# Patient Record
Sex: MALE | Race: WHITE | Employment: FULL TIME | URBAN - METROPOLITAN AREA
[De-identification: names, ages, dates, MRNs, and addresses within clinical notes are randomized per-mention and may not be internally consistent; named-entity substitution may affect disease eponyms.]

---

## 2019-11-21 ENCOUNTER — HOSPITAL ENCOUNTER (OUTPATIENT)
Age: 24
Discharge: HOME | End: 2019-11-21
Attending: EMERGENCY MEDICINE
Payer: COMMERCIAL

## 2019-11-21 ENCOUNTER — APPOINTMENT (OUTPATIENT)
Dept: GENERAL RADIOLOGY | Age: 24
End: 2019-11-21
Attending: EMERGENCY MEDICINE
Payer: COMMERCIAL

## 2019-11-21 VITALS
OXYGEN SATURATION: 99 % | SYSTOLIC BLOOD PRESSURE: 124 MMHG | DIASTOLIC BLOOD PRESSURE: 71 MMHG | RESPIRATION RATE: 20 BRPM | HEART RATE: 83 BPM | HEIGHT: 76 IN | BODY MASS INDEX: 25.09 KG/M2 | TEMPERATURE: 98 F | WEIGHT: 206 LBS

## 2019-11-21 DIAGNOSIS — B27.80 OTHER INFECTIOUS MONONUCLEOSIS WITHOUT COMPLICATION: Primary | ICD-10-CM

## 2019-11-21 PROCEDURE — 87430 STREP A AG IA: CPT

## 2019-11-21 PROCEDURE — 87081 CULTURE SCREEN ONLY: CPT | Performed by: EMERGENCY MEDICINE

## 2019-11-21 PROCEDURE — 99204 OFFICE O/P NEW MOD 45 MIN: CPT

## 2019-11-21 PROCEDURE — 86308 HETEROPHILE ANTIBODY SCREEN: CPT | Performed by: EMERGENCY MEDICINE

## 2019-11-21 PROCEDURE — 36415 COLL VENOUS BLD VENIPUNCTURE: CPT

## 2019-11-21 PROCEDURE — 70360 X-RAY EXAM OF NECK: CPT | Performed by: EMERGENCY MEDICINE

## 2019-11-21 RX ORDER — DEXTROAMPHETAMINE SACCHARATE, AMPHETAMINE ASPARTATE MONOHYDRATE, DEXTROAMPHETAMINE SULFATE AND AMPHETAMINE SULFATE 2.5; 2.5; 2.5; 2.5 MG/1; MG/1; MG/1; MG/1
10 CAPSULE, EXTENDED RELEASE ORAL EVERY MORNING
COMMUNITY

## 2019-11-21 NOTE — ED INITIAL ASSESSMENT (HPI)
REPORTS RIGHT SIDED EAR PAIN AND RIGHT SIDED FACIAL SWELLING ON 11/17. PATIENT STATES HE WAS DX WITH A VIRAL INFECTION AND PRESCRIBED PREDNISONE AND SUDAFED. STATES HE FLEW HERE FROM [place] YESTERDAY WITH INCREASED PAIN TO HIS RIGHT EAR.   PATIENT WITH

## 2019-11-21 NOTE — ED PROVIDER NOTES
Patient Seen in: 5 [facility]      History   Patient presents with:  Ear Problem Pain (neurosensory)    Stated Complaint: sore throat, right ear pain    HPI    Patient is a 40-year-old male without significant past medical minimal swelling, uvula midline, no drooling trismus or stridor  Neck: Supple with tender right lateral adenopathy  CV: Regular rate and rhythm no murmur, no gallop, no rub  Respiratory: Clear to auscultation bilaterally, good air movement, no wheezing or

## 2021-06-09 ENCOUNTER — OFFICE VISIT (OUTPATIENT)
Dept: INTERNAL MEDICINE CLINIC | Facility: CLINIC | Age: 26
End: 2021-06-09
Payer: COMMERCIAL

## 2021-06-09 VITALS
HEART RATE: 86 BPM | WEIGHT: 193.81 LBS | OXYGEN SATURATION: 99 % | SYSTOLIC BLOOD PRESSURE: 120 MMHG | BODY MASS INDEX: 23.6 KG/M2 | DIASTOLIC BLOOD PRESSURE: 70 MMHG | HEIGHT: 76 IN

## 2021-06-09 DIAGNOSIS — E53.8 VITAMIN B12 DEFICIENCY: Primary | ICD-10-CM

## 2021-06-09 DIAGNOSIS — Z00.00 ANNUAL PHYSICAL EXAM: Primary | ICD-10-CM

## 2021-06-09 DIAGNOSIS — L98.9 SKIN LESION: ICD-10-CM

## 2021-06-09 DIAGNOSIS — Z86.69 HISTORY OF CHIARI MALFORMATION: ICD-10-CM

## 2021-06-09 DIAGNOSIS — E53.8 VITAMIN B12 DEFICIENCY: ICD-10-CM

## 2021-06-09 PROCEDURE — 3074F SYST BP LT 130 MM HG: CPT | Performed by: INTERNAL MEDICINE

## 2021-06-09 PROCEDURE — 87591 N.GONORRHOEAE DNA AMP PROB: CPT | Performed by: INTERNAL MEDICINE

## 2021-06-09 PROCEDURE — 82306 VITAMIN D 25 HYDROXY: CPT | Performed by: INTERNAL MEDICINE

## 2021-06-09 PROCEDURE — 99385 PREV VISIT NEW AGE 18-39: CPT | Performed by: INTERNAL MEDICINE

## 2021-06-09 PROCEDURE — 90651 9VHPV VACCINE 2/3 DOSE IM: CPT | Performed by: INTERNAL MEDICINE

## 2021-06-09 PROCEDURE — 90471 IMMUNIZATION ADMIN: CPT | Performed by: INTERNAL MEDICINE

## 2021-06-09 PROCEDURE — 3078F DIAST BP <80 MM HG: CPT | Performed by: INTERNAL MEDICINE

## 2021-06-09 PROCEDURE — 82607 VITAMIN B-12: CPT | Performed by: INTERNAL MEDICINE

## 2021-06-09 PROCEDURE — 3008F BODY MASS INDEX DOCD: CPT | Performed by: INTERNAL MEDICINE

## 2021-06-09 PROCEDURE — 80050 GENERAL HEALTH PANEL: CPT | Performed by: INTERNAL MEDICINE

## 2021-06-09 PROCEDURE — 80061 LIPID PANEL: CPT | Performed by: INTERNAL MEDICINE

## 2021-06-09 PROCEDURE — 87491 CHLMYD TRACH DNA AMP PROBE: CPT | Performed by: INTERNAL MEDICINE

## 2021-06-09 RX ORDER — CYANOCOBALAMIN 1000 UG/ML
1000 INJECTION INTRAMUSCULAR; SUBCUTANEOUS
Status: SHIPPED | OUTPATIENT
Start: 2021-06-23 | End: 2021-07-20

## 2021-06-09 RX ORDER — DEXTROAMPHETAMINE SACCHARATE, AMPHETAMINE ASPARTATE, DEXTROAMPHETAMINE SULFATE AND AMPHETAMINE SULFATE 3.75; 3.75; 3.75; 3.75 MG/1; MG/1; MG/1; MG/1
TABLET ORAL
COMMUNITY

## 2021-06-09 NOTE — PROGRESS NOTES
Rossana Moise is a 32year old male.     Chief complaint: annual physical exam     HPI:     Rossana Moise is a 32year old pleasant male who presents for annual physical exam     History of chiari malformation   Had surgery back in 2013  Edu weinberg supple,no adenopathy  LUNGS: clear to auscultation  CARDIO: RRR without murmur  GI: no masses, HSM or tenderness  EXTREMITIES: no cyanosis, clubbing or edema  NEURO: no gross deficits              Orders Placed This Encounter      amphetamine-dextroampheta 25-HYDROXY; Future  - VITAMIN B12; Future  - TSH W REFLEX TO FREE T4; Future  - HPV HUMAN PAPILLOMA VIRUS VACC 9 KENDAL 3 DOSE IM  - CHLAMYDIA/GONOCOCCUS, ALBINO;  Future  - DERM - INTERNAL  - CBC WITH DIFFERENTIAL WITH PLATELET  - COMP METABOLIC PANEL (14)  - CHYNA

## 2021-06-10 PROBLEM — Z00.00 ANNUAL PHYSICAL EXAM: Status: ACTIVE | Noted: 2021-06-10

## 2021-06-10 PROBLEM — Z86.69 HISTORY OF CHIARI MALFORMATION: Status: ACTIVE | Noted: 2021-06-10

## 2021-06-10 PROBLEM — E53.8 VITAMIN B12 DEFICIENCY: Status: ACTIVE | Noted: 2021-06-10

## 2021-06-14 RX ORDER — ERGOCALCIFEROL 1.25 MG/1
50000 CAPSULE ORAL WEEKLY
Qty: 12 CAPSULE | Refills: 1 | Status: SHIPPED | OUTPATIENT
Start: 2021-06-14 | End: 2021-06-29

## 2021-06-15 ENCOUNTER — NURSE ONLY (OUTPATIENT)
Dept: INTERNAL MEDICINE CLINIC | Facility: CLINIC | Age: 26
End: 2021-06-15
Payer: COMMERCIAL

## 2021-06-15 PROCEDURE — 96372 THER/PROPH/DIAG INJ SC/IM: CPT | Performed by: INTERNAL MEDICINE

## 2021-06-15 RX ADMIN — CYANOCOBALAMIN 1000 MCG: 1000 INJECTION INTRAMUSCULAR; SUBCUTANEOUS at 13:44:00

## 2021-06-15 NOTE — PROGRESS NOTES
Patient presents for Vit B12 injection. Administered in left deltoid. Patient tolerated well and left the office without complaints.

## 2021-06-22 ENCOUNTER — NURSE ONLY (OUTPATIENT)
Dept: INTERNAL MEDICINE CLINIC | Facility: CLINIC | Age: 26
End: 2021-06-22
Payer: COMMERCIAL

## 2021-06-22 PROCEDURE — 96372 THER/PROPH/DIAG INJ SC/IM: CPT | Performed by: INTERNAL MEDICINE

## 2021-06-22 RX ADMIN — CYANOCOBALAMIN 1000 MCG: 1000 INJECTION INTRAMUSCULAR; SUBCUTANEOUS at 13:01:00

## 2021-06-29 RX ORDER — ERGOCALCIFEROL 1.25 MG/1
50000 CAPSULE ORAL WEEKLY
Qty: 12 CAPSULE | Refills: 1 | Status: SHIPPED | OUTPATIENT
Start: 2021-06-29 | End: 2021-09-15

## 2021-06-30 ENCOUNTER — NURSE ONLY (OUTPATIENT)
Dept: INTERNAL MEDICINE CLINIC | Facility: CLINIC | Age: 26
End: 2021-06-30
Payer: COMMERCIAL

## 2021-06-30 PROCEDURE — 96372 THER/PROPH/DIAG INJ SC/IM: CPT | Performed by: INTERNAL MEDICINE

## 2021-06-30 RX ADMIN — CYANOCOBALAMIN 1000 MCG: 1000 INJECTION INTRAMUSCULAR; SUBCUTANEOUS at 12:52:00

## 2021-07-06 ENCOUNTER — NURSE ONLY (OUTPATIENT)
Dept: INTERNAL MEDICINE CLINIC | Facility: CLINIC | Age: 26
End: 2021-07-06
Payer: COMMERCIAL

## 2021-07-06 DIAGNOSIS — E53.8 VITAMIN B12 DEFICIENCY: ICD-10-CM

## 2021-07-30 ENCOUNTER — OFFICE VISIT (OUTPATIENT)
Dept: FAMILY MEDICINE CLINIC | Facility: CLINIC | Age: 26
End: 2021-07-30
Payer: COMMERCIAL

## 2021-07-30 VITALS
OXYGEN SATURATION: 100 % | SYSTOLIC BLOOD PRESSURE: 120 MMHG | TEMPERATURE: 98 F | DIASTOLIC BLOOD PRESSURE: 68 MMHG | WEIGHT: 196 LBS | BODY MASS INDEX: 23.87 KG/M2 | HEART RATE: 93 BPM | RESPIRATION RATE: 16 BRPM | HEIGHT: 76 IN

## 2021-07-30 DIAGNOSIS — Z11.52 ENCOUNTER FOR SCREENING FOR COVID-19: ICD-10-CM

## 2021-07-30 DIAGNOSIS — R05.9 COUGH: Primary | ICD-10-CM

## 2021-07-30 PROCEDURE — 99202 OFFICE O/P NEW SF 15 MIN: CPT | Performed by: PHYSICIAN ASSISTANT

## 2021-07-30 PROCEDURE — 3078F DIAST BP <80 MM HG: CPT | Performed by: PHYSICIAN ASSISTANT

## 2021-07-30 PROCEDURE — 3074F SYST BP LT 130 MM HG: CPT | Performed by: PHYSICIAN ASSISTANT

## 2021-07-30 PROCEDURE — 3008F BODY MASS INDEX DOCD: CPT | Performed by: PHYSICIAN ASSISTANT

## 2021-07-30 NOTE — PROGRESS NOTES
Patient presents with:  Covid: having symp diarrhea,nausea, and SOB      HPI:   Brooks Porter is a 32year old male who presents for COVID concerns. Patient had no known exposure to COVID/strep but does work as a .  Mild symptoms at this REVIEW OF SYSTEMS:   GENERAL:  good appetite  SKIN: no rashes or abnormal skin lesions  HEENT: See HPI.     LUNGS: denies shortness of breath or wheezing   CARDIOVASCULAR: denies chest pain or palpitations   GI: denies abdominal pain or nausea  NEURO: m instructions also in [address] Follow up prn or with pcp/clinic if symptoms develop / worsen as  within 1-2 days discussed, ED precautions advised. Patient understands and agrees with plan.      Madison Monzon PA-C  7/30/2021  2:55 PM

## 2021-07-31 LAB — SARS-COV-2 RNA RESP QL NAA+PROBE: NOT DETECTED

## 2021-08-09 ENCOUNTER — NURSE ONLY (OUTPATIENT)
Dept: INTERNAL MEDICINE CLINIC | Facility: CLINIC | Age: 26
End: 2021-08-09
Payer: COMMERCIAL

## 2021-08-09 DIAGNOSIS — E53.8 VITAMIN B12 DEFICIENCY: ICD-10-CM

## 2021-08-09 DIAGNOSIS — Z23 NEED FOR HPV VACCINATION: Primary | ICD-10-CM

## 2021-08-09 PROCEDURE — 96372 THER/PROPH/DIAG INJ SC/IM: CPT | Performed by: INTERNAL MEDICINE

## 2021-08-09 PROCEDURE — 90471 IMMUNIZATION ADMIN: CPT | Performed by: INTERNAL MEDICINE

## 2021-08-09 PROCEDURE — 90651 9VHPV VACCINE 2/3 DOSE IM: CPT | Performed by: INTERNAL MEDICINE

## 2021-08-09 RX ORDER — CYANOCOBALAMIN 1000 UG/ML
1000 INJECTION INTRAMUSCULAR; SUBCUTANEOUS ONCE
Status: COMPLETED | OUTPATIENT
Start: 2021-08-09 | End: 2021-08-09

## 2021-08-09 RX ADMIN — CYANOCOBALAMIN 1000 MCG: 1000 INJECTION INTRAMUSCULAR; SUBCUTANEOUS at 12:43:00

## 2021-09-09 ENCOUNTER — TELEPHONE (OUTPATIENT)
Dept: INTERNAL MEDICINE CLINIC | Facility: CLINIC | Age: 26
End: 2021-09-09

## 2021-09-09 ENCOUNTER — NURSE ONLY (OUTPATIENT)
Dept: INTERNAL MEDICINE CLINIC | Facility: CLINIC | Age: 26
End: 2021-09-09
Payer: COMMERCIAL

## 2021-09-09 DIAGNOSIS — E53.8 VITAMIN B12 DEFICIENCY: Primary | ICD-10-CM

## 2021-09-09 PROCEDURE — 96372 THER/PROPH/DIAG INJ SC/IM: CPT | Performed by: INTERNAL MEDICINE

## 2021-09-09 RX ORDER — CYANOCOBALAMIN 1000 UG/ML
1000 INJECTION INTRAMUSCULAR; SUBCUTANEOUS ONCE
Status: COMPLETED | OUTPATIENT
Start: 2021-09-09 | End: 2021-09-09

## 2021-09-09 RX ADMIN — CYANOCOBALAMIN 1000 MCG: 1000 INJECTION INTRAMUSCULAR; SUBCUTANEOUS at 09:21:00

## 2021-09-09 NOTE — PROGRESS NOTES
Patient presents for B12 injection. Administered in left deltoid. Patient tolerated well and left the office without complaints.

## 2021-09-09 NOTE — TELEPHONE ENCOUNTER
Patient came in today for his final B12 injection. He would like to know when he should repeat his labs to check his levels. He is also asking about the HPV vaccine.  He was told that he would only need 2 doses but his partner who is the same age is doing 1

## 2021-10-14 ENCOUNTER — LAB ENCOUNTER (OUTPATIENT)
Dept: LAB | Age: 26
End: 2021-10-14
Attending: INTERNAL MEDICINE
Payer: COMMERCIAL

## 2021-10-14 DIAGNOSIS — E53.8 VITAMIN B12 DEFICIENCY: ICD-10-CM

## 2021-10-14 PROCEDURE — 36415 COLL VENOUS BLD VENIPUNCTURE: CPT

## 2021-10-14 PROCEDURE — 82607 VITAMIN B-12: CPT

## 2021-10-15 DIAGNOSIS — E53.8 VITAMIN B12 DEFICIENCY: Primary | ICD-10-CM

## 2021-10-15 RX ORDER — CYANOCOBALAMIN 1000 UG/ML
1000 INJECTION INTRAMUSCULAR; SUBCUTANEOUS
Status: SHIPPED | OUTPATIENT
Start: 2021-11-15 | End: 2022-02-13

## 2021-11-18 ENCOUNTER — IMMUNIZATION (OUTPATIENT)
Dept: LAB | Facility: HOSPITAL | Age: 26
End: 2021-11-18
Attending: EMERGENCY MEDICINE
Payer: COMMERCIAL

## 2021-11-18 DIAGNOSIS — Z23 NEED FOR VACCINATION: Primary | ICD-10-CM

## 2021-11-18 PROCEDURE — 0064A SARSCOV2 VAC 50MCG/0.25ML IM: CPT | Performed by: NURSE PRACTITIONER

## 2022-07-01 ENCOUNTER — OFFICE VISIT (OUTPATIENT)
Dept: FAMILY MEDICINE CLINIC | Facility: CLINIC | Age: 27
End: 2022-07-01
Payer: COMMERCIAL

## 2022-07-01 VITALS
HEIGHT: 76 IN | DIASTOLIC BLOOD PRESSURE: 65 MMHG | OXYGEN SATURATION: 99 % | SYSTOLIC BLOOD PRESSURE: 124 MMHG | RESPIRATION RATE: 18 BRPM | TEMPERATURE: 98 F | WEIGHT: 200 LBS | HEART RATE: 87 BPM | BODY MASS INDEX: 24.36 KG/M2

## 2022-07-01 DIAGNOSIS — U07.1 POSITIVE SELF-ADMINISTERED ANTIGEN TEST FOR COVID-19: Primary | ICD-10-CM

## 2022-07-01 RX ORDER — DEXTROAMPHETAMINE/AMPHETAMINE 15 MG
15 CAPSULE, EXT RELEASE 24 HR ORAL 2 TIMES DAILY
COMMUNITY
Start: 2022-06-27

## 2022-07-02 LAB — SARS-COV-2 RNA RESP QL NAA+PROBE: DETECTED

## 2023-04-03 ENCOUNTER — OFFICE VISIT (OUTPATIENT)
Dept: INTERNAL MEDICINE CLINIC | Facility: CLINIC | Age: 28
End: 2023-04-03
Payer: COMMERCIAL

## 2023-04-03 VITALS
BODY MASS INDEX: 26.26 KG/M2 | OXYGEN SATURATION: 98 % | HEIGHT: 76 IN | SYSTOLIC BLOOD PRESSURE: 124 MMHG | HEART RATE: 82 BPM | DIASTOLIC BLOOD PRESSURE: 78 MMHG | WEIGHT: 215.63 LBS

## 2023-04-03 DIAGNOSIS — E53.8 VITAMIN B12 DEFICIENCY: ICD-10-CM

## 2023-04-03 DIAGNOSIS — E55.9 VITAMIN D DEFICIENCY: ICD-10-CM

## 2023-04-03 DIAGNOSIS — Z12.83 SKIN CANCER SCREENING: ICD-10-CM

## 2023-04-03 DIAGNOSIS — Z00.00 ANNUAL PHYSICAL EXAM: Primary | ICD-10-CM

## 2023-04-03 PROCEDURE — 90651 9VHPV VACCINE 2/3 DOSE IM: CPT | Performed by: INTERNAL MEDICINE

## 2023-04-03 PROCEDURE — 99395 PREV VISIT EST AGE 18-39: CPT | Performed by: INTERNAL MEDICINE

## 2023-04-03 PROCEDURE — 3008F BODY MASS INDEX DOCD: CPT | Performed by: INTERNAL MEDICINE

## 2023-04-03 PROCEDURE — 3078F DIAST BP <80 MM HG: CPT | Performed by: INTERNAL MEDICINE

## 2023-04-03 PROCEDURE — 90471 IMMUNIZATION ADMIN: CPT | Performed by: INTERNAL MEDICINE

## 2023-04-03 PROCEDURE — 3074F SYST BP LT 130 MM HG: CPT | Performed by: INTERNAL MEDICINE

## 2023-04-06 ENCOUNTER — LAB ENCOUNTER (OUTPATIENT)
Dept: LAB | Age: 28
End: 2023-04-06
Attending: INTERNAL MEDICINE
Payer: COMMERCIAL

## 2023-04-06 DIAGNOSIS — Z00.00 ANNUAL PHYSICAL EXAM: ICD-10-CM

## 2023-04-06 DIAGNOSIS — Z12.83 SKIN CANCER SCREENING: ICD-10-CM

## 2023-04-06 DIAGNOSIS — E53.8 VITAMIN B12 DEFICIENCY: ICD-10-CM

## 2023-04-06 DIAGNOSIS — E55.9 VITAMIN D DEFICIENCY: ICD-10-CM

## 2023-04-06 LAB
ALBUMIN SERPL-MCNC: 4.2 G/DL (ref 3.4–5)
ALBUMIN/GLOB SERPL: 1.4 {RATIO} (ref 1–2)
ALP LIVER SERPL-CCNC: 80 U/L
ALT SERPL-CCNC: 35 U/L
ANION GAP SERPL CALC-SCNC: 4 MMOL/L (ref 0–18)
AST SERPL-CCNC: 34 U/L (ref 15–37)
BASOPHILS # BLD AUTO: 0.04 X10(3) UL (ref 0–0.2)
BASOPHILS NFR BLD AUTO: 0.9 %
BILIRUB SERPL-MCNC: 0.5 MG/DL (ref 0.1–2)
BUN BLD-MCNC: 18 MG/DL (ref 7–18)
BUN/CREAT SERPL: 18.2 (ref 10–20)
CALCIUM BLD-MCNC: 9.6 MG/DL (ref 8.5–10.1)
CHLORIDE SERPL-SCNC: 108 MMOL/L (ref 98–112)
CHOLEST SERPL-MCNC: 152 MG/DL (ref ?–200)
CO2 SERPL-SCNC: 29 MMOL/L (ref 21–32)
CREAT BLD-MCNC: 0.99 MG/DL
DEPRECATED RDW RBC AUTO: 40.7 FL (ref 35.1–46.3)
EOSINOPHIL # BLD AUTO: 0.07 X10(3) UL (ref 0–0.7)
EOSINOPHIL NFR BLD AUTO: 1.5 %
ERYTHROCYTE [DISTWIDTH] IN BLOOD BY AUTOMATED COUNT: 12.1 % (ref 11–15)
EST. AVERAGE GLUCOSE BLD GHB EST-MCNC: 103 MG/DL (ref 68–126)
FASTING PATIENT LIPID ANSWER: YES
FASTING STATUS PATIENT QL REPORTED: YES
GFR SERPLBLD BASED ON 1.73 SQ M-ARVRAT: 106 ML/MIN/1.73M2 (ref 60–?)
GLOBULIN PLAS-MCNC: 2.9 G/DL (ref 2.8–4.4)
GLUCOSE BLD-MCNC: 90 MG/DL (ref 70–99)
HBA1C MFR BLD: 5.2 % (ref ?–5.7)
HCT VFR BLD AUTO: 46.6 %
HDLC SERPL-MCNC: 85 MG/DL (ref 40–59)
HGB BLD-MCNC: 15.6 G/DL
IMM GRANULOCYTES # BLD AUTO: 0.01 X10(3) UL (ref 0–1)
IMM GRANULOCYTES NFR BLD: 0.2 %
LDLC SERPL CALC-MCNC: 58 MG/DL (ref ?–100)
LYMPHOCYTES # BLD AUTO: 1.47 X10(3) UL (ref 1–4)
LYMPHOCYTES NFR BLD AUTO: 32.1 %
MCH RBC QN AUTO: 30.5 PG (ref 26–34)
MCHC RBC AUTO-ENTMCNC: 33.5 G/DL (ref 31–37)
MCV RBC AUTO: 91.2 FL
MONOCYTES # BLD AUTO: 0.42 X10(3) UL (ref 0.1–1)
MONOCYTES NFR BLD AUTO: 9.2 %
NEUTROPHILS # BLD AUTO: 2.57 X10 (3) UL (ref 1.5–7.7)
NEUTROPHILS # BLD AUTO: 2.57 X10(3) UL (ref 1.5–7.7)
NEUTROPHILS NFR BLD AUTO: 56.1 %
NONHDLC SERPL-MCNC: 67 MG/DL (ref ?–130)
OSMOLALITY SERPL CALC.SUM OF ELEC: 293 MOSM/KG (ref 275–295)
PLATELET # BLD AUTO: 273 10(3)UL (ref 150–450)
POTASSIUM SERPL-SCNC: 4.7 MMOL/L (ref 3.5–5.1)
PROT SERPL-MCNC: 7.1 G/DL (ref 6.4–8.2)
RBC # BLD AUTO: 5.11 X10(6)UL
SODIUM SERPL-SCNC: 141 MMOL/L (ref 136–145)
TRIGL SERPL-MCNC: 38 MG/DL (ref 30–149)
VIT B12 SERPL-MCNC: 214 PG/ML (ref 193–986)
VIT D+METAB SERPL-MCNC: 31.7 NG/ML (ref 30–100)
VLDLC SERPL CALC-MCNC: 6 MG/DL (ref 0–30)
WBC # BLD AUTO: 4.6 X10(3) UL (ref 4–11)

## 2023-04-06 PROCEDURE — 80061 LIPID PANEL: CPT

## 2023-04-06 PROCEDURE — 80053 COMPREHEN METABOLIC PANEL: CPT

## 2023-04-06 PROCEDURE — 36415 COLL VENOUS BLD VENIPUNCTURE: CPT

## 2023-04-06 PROCEDURE — 82306 VITAMIN D 25 HYDROXY: CPT

## 2023-04-06 PROCEDURE — 86364 TISS TRNSGLTMNASE EA IG CLAS: CPT

## 2023-04-06 PROCEDURE — 82607 VITAMIN B-12: CPT

## 2023-04-06 PROCEDURE — 85025 COMPLETE CBC W/AUTO DIFF WBC: CPT

## 2023-04-06 PROCEDURE — 83516 IMMUNOASSAY NONANTIBODY: CPT

## 2023-04-06 PROCEDURE — 82784 ASSAY IGA/IGD/IGG/IGM EACH: CPT

## 2023-04-06 PROCEDURE — 83036 HEMOGLOBIN GLYCOSYLATED A1C: CPT

## 2023-04-07 DIAGNOSIS — E53.8 VITAMIN B12 DEFICIENCY: Primary | ICD-10-CM

## 2023-04-07 LAB — IGA SERPL-MCNC: 215 MG/DL (ref 70–312)

## 2023-04-08 LAB — PARIETAL CELL AB: 7.6 UNITS

## 2023-04-12 LAB — TTG IGA SER-ACNC: 0.6 U/ML (ref ?–7)

## 2024-05-14 ENCOUNTER — OFFICE VISIT (OUTPATIENT)
Dept: INTERNAL MEDICINE CLINIC | Facility: CLINIC | Age: 29
End: 2024-05-14

## 2024-05-14 ENCOUNTER — HOSPITAL ENCOUNTER (OUTPATIENT)
Age: 29
Discharge: HOME | End: 2024-05-14

## 2024-05-14 VITALS
HEIGHT: 76 IN | WEIGHT: 231 LBS | OXYGEN SATURATION: 99 % | HEART RATE: 114 BPM | SYSTOLIC BLOOD PRESSURE: 110 MMHG | DIASTOLIC BLOOD PRESSURE: 68 MMHG | BODY MASS INDEX: 28.13 KG/M2

## 2024-05-14 DIAGNOSIS — Z20.6 EXPOSURE TO HIV: Primary | ICD-10-CM

## 2024-05-14 PROCEDURE — 3078F DIAST BP <80 MM HG: CPT

## 2024-05-14 PROCEDURE — 3008F BODY MASS INDEX DOCD: CPT

## 2024-05-14 PROCEDURE — 99214 OFFICE O/P EST MOD 30 MIN: CPT

## 2024-05-14 PROCEDURE — 3074F SYST BP LT 130 MM HG: CPT

## 2024-05-14 RX ORDER — DOLUTEGRAVIR SODIUM 50 MG/1
50 TABLET, FILM COATED ORAL DAILY
Qty: 28 TABLET | Refills: 0 | Status: SHIPPED | OUTPATIENT
Start: 2024-05-14

## 2024-05-14 RX ORDER — EMTRICITABINE AND TENOFOVIR ALAFENAMIDE 200; 25 MG/1; MG/1
1 TABLET ORAL DAILY
Qty: 30 TABLET | Refills: 0 | Status: SHIPPED | OUTPATIENT
Start: 2024-05-14 | End: 2024-05-14 | Stop reason: RX

## 2024-05-14 RX ORDER — EMTRICITABINE AND TENOFOVIR ALAFENAMIDE 200; 25 MG/1; MG/1
1 TABLET ORAL DAILY
Qty: 30 TABLET | Refills: 0 | Status: SHIPPED | OUTPATIENT
Start: 2024-05-14 | End: 2024-06-13

## 2024-05-14 NOTE — PROGRESS NOTES
CHIEF COMPLAINT:     Chief Complaint   Patient presents with    Medication Request     HPI:   Nik Henley is a 29 year old male who presents for request of medication, for post exposure to +HIV person on evening of 5/12/24.  No prior history of renal or liver health problems.  No known history of prior HIV.  No penile discharge or lesions.  Here with male partner Mckay  Med: Amphetamine-dextroamphetamine 15 mg oral tab- Historical   Past Medical History:    ADD (attention deficit disorder)    Anxiety    Depression      Social History:  Social History     Socioeconomic History    Marital status: Single   Tobacco Use    Smoking status: Never    Smokeless tobacco: Never   Vaping Use    Vaping status: Never Used   Substance and Sexual Activity    Alcohol use: Yes     Alcohol/week: 2.0 standard drinks of alcohol     Types: 1 Shots of liquor, 1 Glasses of wine per week     Comment: social     Drug use: Never        REVIEW OF SYSTEMS:   GENERAL: No report of fever or change in appetite  SKIN: No rashes or bruises  GI: Denies abdominal pain, N/V/C/D.   : No change in urinary pattern    EXAM:   /68   Pulse 114   Ht 6' 4\" (1.93 m)   Wt 231 lb (104.8 kg)   SpO2 99%   BMI 28.12 kg/m²   Vitals:    05/14/24 1458   BP: 110/68   Pulse: 114   SpO2: 99%   Weight: 231 lb (104.8 kg)   Height: 6' 4\" (1.93 m)     GENERAL: well developed, well nourished,in no apparent distress  SKIN: no rashes on abd or arms  NECK: supple, non-tender  GI: No masses. BS's present x4. No palpable masses or hepatosplenomegaly.  no tenderness on palpation.    ASSESSMENT AND PLAN:     ASSESSMENT/PLAN:    1. Exposure to HIV    - HIV AG AB Combo [E]; Future  - Comp Metabolic Panel (14); Future  - CBC With Differential With Platelet; Future  - Hepatitis A B + C profile [E]; Future  - HIV Ag/Ab Combo; Future  - HIV Ag/Ab Combo; Future    Follow up HIV combo labs ordered for 6 and 12 weeks out.      Medications    Emtricitabine-Tenofovir AF  (DESCOVY) 200-25 MG Oral Tab    dolutegravir (TIVICAY) 50 MG Oral Tab     Descovy was the only local medication available tonight, in this class. Tivicay is a special order mailed to them through [facility]    Patient Instructions   Please get baseline labs done at either [place] or [facility] outpatient labs. They are open until 8pm and open again at 6:30am.   There is an order in place for follow-up labs for 6 and 12 weeks out as well.  Please set it up an appointment for an annual physical with Dr. Parra.  I will be in touch on Thursday, 5/16/24 or message me through My Chart,  Kindly, EFRAIN King      The patient indicates understanding of these issues and agrees to the plan.    Case reviewed with Dr. Bailey over phone

## 2024-05-14 NOTE — PATIENT INSTRUCTIONS
Please get baseline labs done at either [place] or [facility] outpatient labs. They are open until 8pm and open again at 6:30am.   There is an order in place for follow-up labs for 6 and 12 weeks out as well.  Please set it up an appointment for an annual physical with Dr. Parra.  I will be in touch on Thursday, 5/16/24 or message me through My Chart,  Kindly, HILARIA Millan

## 2024-05-16 ENCOUNTER — TELEPHONE (OUTPATIENT)
Dept: INTERNAL MEDICINE CLINIC | Facility: CLINIC | Age: 29
End: 2024-05-16

## 2024-05-16 ENCOUNTER — LAB ENCOUNTER (OUTPATIENT)
Dept: LAB | Age: 29
End: 2024-05-16

## 2024-05-16 DIAGNOSIS — Z20.6 EXPOSURE TO HIV: ICD-10-CM

## 2024-05-16 LAB
ALBUMIN SERPL-MCNC: 4.6 G/DL (ref 3.2–4.8)
ALBUMIN/GLOB SERPL: 1.8 {RATIO} (ref 1–2)
ALP LIVER SERPL-CCNC: 72 U/L
ALT SERPL-CCNC: 20 U/L
ANION GAP SERPL CALC-SCNC: 4 MMOL/L (ref 0–18)
AST SERPL-CCNC: 21 U/L (ref ?–34)
BASOPHILS # BLD AUTO: 0.01 X10(3) UL (ref 0–0.2)
BASOPHILS NFR BLD AUTO: 0.2 %
BILIRUB SERPL-MCNC: 0.9 MG/DL (ref 0.3–1.2)
BUN BLD-MCNC: 10 MG/DL (ref 9–23)
BUN/CREAT SERPL: 11.2 (ref 10–20)
CALCIUM BLD-MCNC: 9.5 MG/DL (ref 8.7–10.4)
CHLORIDE SERPL-SCNC: 109 MMOL/L (ref 98–112)
CO2 SERPL-SCNC: 29 MMOL/L (ref 21–32)
CREAT BLD-MCNC: 0.89 MG/DL
DEPRECATED RDW RBC AUTO: 39.1 FL (ref 35.1–46.3)
EGFRCR SERPLBLD CKD-EPI 2021: 119 ML/MIN/1.73M2 (ref 60–?)
EOSINOPHIL # BLD AUTO: 0.11 X10(3) UL (ref 0–0.7)
EOSINOPHIL NFR BLD AUTO: 2.6 %
ERYTHROCYTE [DISTWIDTH] IN BLOOD BY AUTOMATED COUNT: 12 % (ref 11–15)
FASTING STATUS PATIENT QL REPORTED: YES
GLOBULIN PLAS-MCNC: 2.6 G/DL (ref 2–3.5)
GLUCOSE BLD-MCNC: 91 MG/DL (ref 70–99)
HAV AB SER QL IA: REACTIVE
HAV IGM SER QL: NONREACTIVE
HBV CORE AB SERPL QL IA: NONREACTIVE
HBV SURFACE AB SER QL: NONREACTIVE
HBV SURFACE AB SERPL IA-ACNC: <3.1 MIU/ML
HBV SURFACE AG SERPL QL IA: NONREACTIVE
HCT VFR BLD AUTO: 45.7 %
HCV AB SERPL QL IA: NONREACTIVE
HGB BLD-MCNC: 15.8 G/DL
IMM GRANULOCYTES # BLD AUTO: 0.01 X10(3) UL (ref 0–1)
IMM GRANULOCYTES NFR BLD: 0.2 %
LYMPHOCYTES # BLD AUTO: 0.59 X10(3) UL (ref 1–4)
LYMPHOCYTES NFR BLD AUTO: 14 %
MCH RBC QN AUTO: 30.7 PG (ref 26–34)
MCHC RBC AUTO-ENTMCNC: 34.6 G/DL (ref 31–37)
MCV RBC AUTO: 88.7 FL
MONOCYTES # BLD AUTO: 0.45 X10(3) UL (ref 0.1–1)
MONOCYTES NFR BLD AUTO: 10.7 %
NEUTROPHILS # BLD AUTO: 3.03 X10 (3) UL (ref 1.5–7.7)
NEUTROPHILS # BLD AUTO: 3.03 X10(3) UL (ref 1.5–7.7)
NEUTROPHILS NFR BLD AUTO: 72.3 %
OSMOLALITY SERPL CALC.SUM OF ELEC: 293 MOSM/KG (ref 275–295)
PLATELET # BLD AUTO: 256 10(3)UL (ref 150–450)
POTASSIUM SERPL-SCNC: 4.2 MMOL/L (ref 3.5–5.1)
PROT SERPL-MCNC: 7.2 G/DL (ref 5.7–8.2)
RBC # BLD AUTO: 5.15 X10(6)UL
SODIUM SERPL-SCNC: 142 MMOL/L (ref 136–145)
WBC # BLD AUTO: 4.2 X10(3) UL (ref 4–11)

## 2024-05-16 PROCEDURE — 80503 PATH CLIN CONSLTJ SF 5-20: CPT

## 2024-05-16 PROCEDURE — 86706 HEP B SURFACE ANTIBODY: CPT

## 2024-05-16 PROCEDURE — 87389 HIV-1 AG W/HIV-1&-2 AB AG IA: CPT

## 2024-05-16 PROCEDURE — 86708 HEPATITIS A ANTIBODY: CPT

## 2024-05-16 PROCEDURE — 87340 HEPATITIS B SURFACE AG IA: CPT

## 2024-05-16 PROCEDURE — 86709 HEPATITIS A IGM ANTIBODY: CPT

## 2024-05-16 PROCEDURE — 86803 HEPATITIS C AB TEST: CPT

## 2024-05-16 PROCEDURE — 86704 HEP B CORE ANTIBODY TOTAL: CPT

## 2024-05-16 PROCEDURE — 80053 COMPREHEN METABOLIC PANEL: CPT

## 2024-05-16 PROCEDURE — 85025 COMPLETE CBC W/AUTO DIFF WBC: CPT

## 2024-05-16 NOTE — TELEPHONE ENCOUNTER
Patient called with medication question,  Please call back for medication direction clarification.

## 2024-05-23 ENCOUNTER — OFFICE VISIT (OUTPATIENT)
Dept: INTERNAL MEDICINE CLINIC | Facility: CLINIC | Age: 29
End: 2024-05-23

## 2024-05-23 VITALS
WEIGHT: 229.38 LBS | TEMPERATURE: 99 F | OXYGEN SATURATION: 99 % | SYSTOLIC BLOOD PRESSURE: 108 MMHG | DIASTOLIC BLOOD PRESSURE: 60 MMHG | HEIGHT: 76 IN | BODY MASS INDEX: 27.93 KG/M2 | HEART RATE: 96 BPM

## 2024-05-23 DIAGNOSIS — H92.01 OTALGIA OF RIGHT EAR: Primary | ICD-10-CM

## 2024-05-23 DIAGNOSIS — J30.89 ENVIRONMENTAL AND SEASONAL ALLERGIES: ICD-10-CM

## 2024-05-23 DIAGNOSIS — H69.93 ETD (EUSTACHIAN TUBE DYSFUNCTION), BILATERAL: ICD-10-CM

## 2024-05-23 PROCEDURE — 3008F BODY MASS INDEX DOCD: CPT

## 2024-05-23 PROCEDURE — 99213 OFFICE O/P EST LOW 20 MIN: CPT

## 2024-05-23 PROCEDURE — 3074F SYST BP LT 130 MM HG: CPT

## 2024-05-23 PROCEDURE — 3078F DIAST BP <80 MM HG: CPT

## 2024-05-23 RX ORDER — CEFDINIR 300 MG/1
CAPSULE ORAL
COMMUNITY
Start: 2024-04-17

## 2024-05-23 NOTE — PROGRESS NOTES
CHIEF COMPLAINT:     Chief Complaint   Patient presents with    Ear Problem     Possible ear infection on right ear, left ear feels congestion- since Sunday night 05/19/2024     HPI:   Nik Henley is a 29 year old male with hx of intermittent right ear pressure nasal congestion and postnasal drainage.  No sore throat  -No ear ringing. Had ear tubes as child.  -Patient works as a  and pain has been worse lately, especially when flying Called off work today.    -Patient has had a history of seasonal and environmental allergies since childhood.  Currently using a 12-hour \"Sinex\" nasal spray, which gives some temporary relief, Recently restarted Flonase nasal spray. Has a little fatigue as not sleeping well.   -No known fever.  Reports some nasal discharge and throat clearing.   -Patient has had noticeable ear symptoms for several days, but had Sinusitis/URI and given Cefdinir 4/17/24      Current Outpatient Medications   Medication Sig Dispense Refill    Emtricitabine-Tenofovir AF (DESCOVY) 200-25 MG Oral Tab Take 1 tablet by mouth daily. 30 tablet 0    dolutegravir (TIVICAY) 50 MG Oral Tab Take 1 tablet (50 mg total) by mouth daily. 28 tablet 0    amphetamine-dextroamphetamine 15 MG Oral Tab       cefdinir 300 MG Oral Cap TAKE 1 CAPSULE (300 MG TOTAL) BY MOUTH TWO TIMES A DAY FOR 7 DAYS. (Patient not taking: Reported on 5/23/2024)        Past Medical History:    ADD (attention deficit disorder)    Anxiety    Depression      Past Surgical History:   Procedure Laterality Date    Brain surgery      Hc implant ear tubes        Family History   Problem Relation Age of Onset    No Known Problems Father     Depression Mother     Other (Other) Mother       Social History     Socioeconomic History    Marital status: Single   Tobacco Use    Smoking status: Never    Smokeless tobacco: Never   Vaping Use    Vaping status: Never Used   Substance and Sexual Activity    Alcohol use: Yes     Alcohol/week: 2.0  standard drinks of alcohol     Types: 1 Shots of liquor, 1 Glasses of wine per week     Comment: social     Drug use: Never         REVIEW OF SYSTEMS:   GENERAL:  See HPI  HEENT: See HPI  LUNGS: denies shortness of breath or wheezing, See HPI. Cough to clear throat.  GI: denies N/V/D  NEURO: Denies headaches    EXAM:   /60   Pulse 96   Temp 98.5 °F (36.9 °C)   Ht 6' 4\" (1.93 m)   Wt 229 lb 6.4 oz (104.1 kg)   SpO2 99%   BMI 27.92 kg/m²   GENERAL: well developed, well nourished.  Pt is in no acute distress  SKIN: no rashes on exposed skin  EYES: conjunctiva clear, EOM intact  EARS: TM's are dull, non-bulging and non-injected, bilaterally serous flud, more hazy on left  NOSE: No exudates, nasal mucosa is erythematous and swollen.   THROAT: Oral mucosa pink, moist. Posterior pharynx is mildly erythematous, with cobblestoning. No tonsillar exudates. Tonsils not seen  NECK: Supple, non-tender  LUNGS: clear to auscultation bilaterally, no wheezes or rhonchi. Breathing is non labored.    CARDIO: RRR without murmur  LYMPH:  no concerning cervical lymphadenopathy.        ASSESSMENT AND PLAN:   Nik Henley is a 29 year old male who presents with   Chief Complaint   Patient presents with    Ear Problem     Possible ear infection on right ear, left ear feels congestion- since Sunday night 05/19/2024         ASSESSMENT  Encounter Diagnoses   Name Primary?    Otalgia of right ear Yes    Environmental and seasonal allergies     ETD (Eustachian tube dysfunction), bilateral         PLAN:  Requested Prescriptions      No prescriptions requested or ordered in this encounter       Patient Instructions   Use a once a day such as Zyrtec, or Zyrtec-D as needed.  You may need this through early June.  Consider restarting in the fall, or as needed  Use oxymetazoline nasal decongestant sparingly, 6 uses or 3 days then interrupt.  Nasal steroid spray such as fluticasone are for nasal congestion prevention and take 4 to 7 days  to start to work. Look down while inserting  -------------------------------------------  Controlling Allergens: Pollen  Constant exposure to allergens means constant allergy symptoms. That’s why it's important to control or stay away from the allergens that cause your symptoms. If you are allergic to pollen, the tips below may help. The more you do to keep away from allergens, the better you’ll feel.     Pollen allergy   The pollen that causes allergies is often not the pollen carried from plant to plant by insects such as butterflies and bees. The types of pollen that most often cause allergies are made by plants (trees, grasses, and weeds) that don't have flowers. These plants make small, light, dry pollen granules. These are easily spread by the wind. They may even spread to places that have few plants, such as urban areas.   Controlling pollen      Keep windows closed, especially when pollen counts are high, and use air conditioning instead.      Below are some tips to help you limit your exposure to pollen:  Check pollen counts and don't spend a lot of time outdoors when counts are high. Pollen counts tend to be higher during warm, dry weather. They also tend to be higher during early morning and late afternoon hours. In some areas, daily pollen counts are reported in the paper and on the radio. Daily pollen tracking apps can also be downloaded to electronic devices.  After spending time outdoors, bathe or shower, wash your hair, and change your clothes. Put these clothes in a part of the house near the door. This helps to reduce bringing pollen into the bedroom.  Stay indoors as much as you can on windy days.  Keep windows closed and air conditioning on, if possible, in your car and your home. Air conditioners filter air and also dehumidify. Change your air conditioning filters regularly.  Have someone else do gardening, yard work, or other outdoor chores. Masks are available if you have to be outdoors.  When  your allergies are at their worst each year, try getting away to a place where your allergies won’t bother you as much. This might be a time to try to plan a vacation. Or to visit a friend or relative.  Talk with your healthcare provider about medicines that can help. Ask if it may be helpful to see an allergist.  Pollen allergy is seasonal  People have allergies only when the pollen to which they are allergic is in the air. Each plant pollinates more or less the same time from year to year. Exactly when a plant starts to pollinate depends on geographical location, not the weather. In mild areas, trees pollinate in the spring, grass in the middle of the warm season, and weeds in the fall leading up to the first frost. In warm places, pollination can occur any time of year.   The [employer] (NAB) records pollen and mold levels from certified stations throughout the [place] Track your geographic area by going to the NAB website at pollen.aaaai.org.  Estephania last reviewed this educational content on 1/1/2022    Consider Oxymetazoline nasal decongestant spray, but MUST limit to 3 days use- as explained on product label.    The patient indicates understanding of these issues and agrees to the plan.

## 2024-05-24 NOTE — PATIENT INSTRUCTIONS
Use a once a day such as Zyrtec, or Zyrtec-D as needed.  You may need this through early June.  Consider restarting in the fall, or as needed  Use oxymetazoline nasal decongestant sparingly, 6 uses or 3 days then interrupt.  Nasal steroid spray such as fluticasone are for nasal congestion prevention and take 4 to 7 days to start to work. Look down while inserting  -------------------------------------------  Controlling Allergens: Pollen  Constant exposure to allergens means constant allergy symptoms. That’s why it's important to control or stay away from the allergens that cause your symptoms. If you are allergic to pollen, the tips below may help. The more you do to keep away from allergens, the better you’ll feel.     Pollen allergy   The pollen that causes allergies is often not the pollen carried from plant to plant by insects such as butterflies and bees. The types of pollen that most often cause allergies are made by plants (trees, grasses, and weeds) that don't have flowers. These plants make small, light, dry pollen granules. These are easily spread by the wind. They may even spread to places that have few plants, such as urban areas.   Controlling pollen      Keep windows closed, especially when pollen counts are high, and use air conditioning instead.      Below are some tips to help you limit your exposure to pollen:  Check pollen counts and don't spend a lot of time outdoors when counts are high. Pollen counts tend to be higher during warm, dry weather. They also tend to be higher during early morning and late afternoon hours. In some areas, daily pollen counts are reported in the paper and on the radio. Daily pollen tracking apps can also be downloaded to electronic devices.  After spending time outdoors, bathe or shower, wash your hair, and change your clothes. Put these clothes in a part of the house near the door. This helps to reduce bringing pollen into the bedroom.  Stay indoors as much as you  can on windy days.  Keep windows closed and air conditioning on, if possible, in your car and your home. Air conditioners filter air and also dehumidify. Change your air conditioning filters regularly.  Have someone else do gardening, yard work, or other outdoor chores. Masks are available if you have to be outdoors.  When your allergies are at their worst each year, try getting away to a place where your allergies won’t bother you as much. This might be a time to try to plan a vacation. Or to visit a friend or relative.  Talk with your healthcare provider about medicines that can help. Ask if it may be helpful to see an allergist.  Pollen allergy is seasonal  People have allergies only when the pollen to which they are allergic is in the air. Each plant pollinates more or less the same time from year to year. Exactly when a plant starts to pollinate depends on geographical location, not the weather. In mild areas, trees pollinate in the spring, grass in the middle of the warm season, and weeds in the fall leading up to the first frost. In warm places, pollination can occur any time of year.   The National [employer] [place] (NAB) records pollen and mold levels from certified stations throughout the [place] Track your geographic area by going to the NAB website at pollen.aaaai.org.  Estephania last reviewed this educational content on 1/1/2022

## 2024-07-01 ENCOUNTER — LAB ENCOUNTER (OUTPATIENT)
Dept: LAB | Age: 29
End: 2024-07-01
Payer: COMMERCIAL

## 2024-07-01 DIAGNOSIS — Z20.6 EXPOSURE TO HIV: ICD-10-CM

## 2024-07-01 PROCEDURE — 87389 HIV-1 AG W/HIV-1&-2 AB AG IA: CPT

## (undated) NOTE — LETTER
Date & Time: 11/21/2019, 6:44 PM  Patient: [facility]  Encounter Provider(s):    Anni Sen MD       To Whom It May Concern:    Lisandro Kim was seen and treated in our department on 11/21/2019.  He should not return to work until 3 to 7 days